# Patient Record
Sex: MALE | Race: WHITE | NOT HISPANIC OR LATINO | Employment: FULL TIME | URBAN - METROPOLITAN AREA
[De-identification: names, ages, dates, MRNs, and addresses within clinical notes are randomized per-mention and may not be internally consistent; named-entity substitution may affect disease eponyms.]

---

## 2022-01-20 PROBLEM — E55.9 VITAMIN D DEFICIENCY: Status: ACTIVE | Noted: 2022-01-20

## 2022-01-20 PROBLEM — L40.59 POLYARTICULAR PSORIATIC ARTHRITIS (HCC): Status: ACTIVE | Noted: 2022-01-20

## 2022-07-21 PROBLEM — L40.0 PLAQUE PSORIASIS: Status: ACTIVE | Noted: 2022-07-21

## 2022-07-21 PROBLEM — M46.90 DACTYLITIS DUE TO SPONDYLOARTHRITIC DISORDER (HCC): Status: ACTIVE | Noted: 2022-07-21

## 2024-01-19 LAB
ALBUMIN SERPL-MCNC: 4.3 G/DL (ref 3.6–5.1)
ALBUMIN/GLOB SERPL: 1.4 (CALC) (ref 1–2.5)
ALP SERPL-CCNC: 49 U/L (ref 36–130)
ALT SERPL-CCNC: 12 U/L (ref 9–46)
AST SERPL-CCNC: 20 U/L (ref 10–40)
BASOPHILS # BLD AUTO: 63 CELLS/UL (ref 0–200)
BASOPHILS NFR BLD AUTO: 0.9 %
BILIRUB SERPL-MCNC: 0.4 MG/DL (ref 0.2–1.2)
BUN SERPL-MCNC: 9 MG/DL (ref 7–25)
BUN/CREAT SERPL: NORMAL (CALC) (ref 6–22)
CALCIUM SERPL-MCNC: 9.5 MG/DL (ref 8.6–10.3)
CHLORIDE SERPL-SCNC: 103 MMOL/L (ref 98–110)
CO2 SERPL-SCNC: 30 MMOL/L (ref 20–32)
CREAT SERPL-MCNC: 0.74 MG/DL (ref 0.6–1.26)
CRP SERPL-MCNC: 0.7 MG/L
EOSINOPHIL # BLD AUTO: 259 CELLS/UL (ref 15–500)
EOSINOPHIL NFR BLD AUTO: 3.7 %
ERYTHROCYTE [DISTWIDTH] IN BLOOD BY AUTOMATED COUNT: 12.5 % (ref 11–15)
ERYTHROCYTE [SEDIMENTATION RATE] IN BLOOD BY WESTERGREN METHOD: 9 MM/H
GFR/BSA.PRED SERPLBLD CYS-BASED-ARV: 124 ML/MIN/1.73M2
GLOBULIN SER CALC-MCNC: 3.1 G/DL (CALC) (ref 1.9–3.7)
GLUCOSE SERPL-MCNC: 78 MG/DL (ref 65–139)
HCT VFR BLD AUTO: 42.5 % (ref 38.5–50)
HGB BLD-MCNC: 14.1 G/DL (ref 13.2–17.1)
LYMPHOCYTES # BLD AUTO: 1610 CELLS/UL (ref 850–3900)
LYMPHOCYTES NFR BLD AUTO: 23 %
MCH RBC QN AUTO: 30.6 PG (ref 27–33)
MCHC RBC AUTO-ENTMCNC: 33.2 G/DL (ref 32–36)
MCV RBC AUTO: 92.2 FL (ref 80–100)
MONOCYTES # BLD AUTO: 644 CELLS/UL (ref 200–950)
MONOCYTES NFR BLD AUTO: 9.2 %
NEUTROPHILS # BLD AUTO: 4424 CELLS/UL (ref 1500–7800)
NEUTROPHILS NFR BLD AUTO: 63.2 %
PLATELET # BLD AUTO: 306 THOUSAND/UL (ref 140–400)
PMV BLD REES-ECKER: 9.8 FL (ref 7.5–12.5)
POTASSIUM SERPL-SCNC: 4.6 MMOL/L (ref 3.5–5.3)
PROT SERPL-MCNC: 7.4 G/DL (ref 6.1–8.1)
RBC # BLD AUTO: 4.61 MILLION/UL (ref 4.2–5.8)
SODIUM SERPL-SCNC: 139 MMOL/L (ref 135–146)
WBC # BLD AUTO: 7 THOUSAND/UL (ref 3.8–10.8)

## 2024-02-01 ENCOUNTER — OFFICE VISIT (OUTPATIENT)
Age: 34
End: 2024-02-01

## 2024-02-01 VITALS
TEMPERATURE: 97.3 F | HEIGHT: 71 IN | SYSTOLIC BLOOD PRESSURE: 122 MMHG | DIASTOLIC BLOOD PRESSURE: 72 MMHG | OXYGEN SATURATION: 99 % | BODY MASS INDEX: 21.61 KG/M2 | WEIGHT: 154.4 LBS | HEART RATE: 44 BPM

## 2024-02-01 DIAGNOSIS — Z79.899 ENCOUNTER FOR LONG-TERM (CURRENT) USE OF OTHER MEDICATIONS: ICD-10-CM

## 2024-02-01 DIAGNOSIS — L40.0 PLAQUE PSORIASIS: ICD-10-CM

## 2024-02-01 DIAGNOSIS — L40.59 POLYARTICULAR PSORIATIC ARTHRITIS (HCC): Primary | ICD-10-CM

## 2024-02-01 DIAGNOSIS — E55.9 VITAMIN D DEFICIENCY: ICD-10-CM

## 2024-02-01 NOTE — PROGRESS NOTES
Assessment/Plan:    Polyarticular psoriatic arthritis (HCC)  Psoriatic arthritis which was previously treated with Humira.  He has been on Tremfya now since April 2023 in view of increased skin disease and incomplete response with Humira.  Overall his symptoms are generally stable however he does have persistent dry patches and flaking on his scalp that have not improved with Tremfya.  He also notices a slight increase in joint pain in the week or 2 prior to each injection.  He is using topical steroid cream however this only provides temporary relief.  I have asked him to follow-up with his dermatologist to discuss additional treatment options.  Given his persistent scalp issues along with some breakthrough joint pain it may be worth considering changing biologic therapy to Taltz for a different mechanism of action.  He would like to see his dermatologist and discuss this with them as well.  We will continue labs as ordered to monitor for medication side effects and toxicity.  He is up-to-date with his QuantiFERON gold testing.  Follow-up with dermatology now to discuss current symptoms.  We will plan to see him in 6 months however I have asked him to reach out after he speaks to his dermatologist regarding changing Biologics.    Plaque psoriasis  Persistent dryness and flaking in his scalp.  He does have some dry patches on his leg noted as well.  Follow-up with dermatology to discuss current therapies.    Vitamin D deficiency  We will continue to monitor vitamin D.  Continue over-the-counter vitamin D3 supplement.       Diagnoses and all orders for this visit:    Polyarticular psoriatic arthritis (HCC)  -     CBC and differential; Future  -     Comprehensive metabolic panel; Future  -     Sedimentation rate, automated; Future  -     C-reactive protein; Future  -     UA (URINE) with reflex to Scope  -     Quantiferon TB Gold Plus Assay; Future    Vitamin D deficiency  -     Vitamin D 25 hydroxy; Future    Plaque  psoriasis    Encounter for long-term (current) use of other medications  -     CBC and differential; Future  -     Comprehensive metabolic panel; Future  -     Sedimentation rate, automated; Future  -     C-reactive protein; Future  -     UA (URINE) with reflex to Scope  -     Quantiferon TB Gold Plus Assay; Future    Other orders  -     Multiple Vitamins-Minerals (MULTIVITAMIN ADULTS PO); Take by mouth        Spent 30 minutes total reviewing labs, chart notes, imaging studies, performing history and physical exam, discussing medication and treatment, counseling patient, and completing chart note.          Subjective:      Patient ID: Benoit Valencia is a 33 y.o. male.    He is here for follow-up of his psoriatic arthritis.  He was initially treated with Humira however he was switched to Tremfya in April 2023 in view of increased skin disease.  He has a history of dactylitis in his right great toe.  His symptoms are generally stable however he continues to have persistent dry patches on flaking on his scalp.  He also notes a slight increase in joint pain in the week or 2 prior to each Tremfya injection.  He did recently have an episode of discomfort in his right foot as well which resolved shortly after injecting.  He also notices occasional episodes of lower back pain and discomfort.  He has no joint swelling and no signs of dactylitis or enthesitis at this time.    He had labs done on 1/18/2024.  CBC, CMP unremarkable.  ESR, CRP within normal limits.  He is up-to-date with his QuantiFERON gold testing and had a negative test on 7/13/2023.      The following portions of the patient's history were reviewed and updated as appropriate: allergies, current medications, past family history, past medical history, past social history, past surgical history, and problem list.    Review of Systems   Constitutional:  Negative for chills, fatigue and fever.   HENT:  Negative for hearing loss, sore throat and tinnitus.    Eyes:  " Negative for pain and visual disturbance.   Respiratory:  Negative for cough and shortness of breath.    Cardiovascular:  Negative for chest pain and palpitations.   Gastrointestinal:  Negative for abdominal pain, nausea and vomiting.   Genitourinary:  Negative for difficulty urinating.   Musculoskeletal:  Negative for arthralgias, back pain, gait problem, joint swelling, myalgias, neck pain and neck stiffness.   Skin:  Negative for rash.   Neurological:  Negative for dizziness, seizures, weakness, numbness and headaches.   Psychiatric/Behavioral:  Negative for confusion, decreased concentration and sleep disturbance.          Objective:      /72 (BP Location: Left arm, Patient Position: Sitting, Cuff Size: Adult)   Pulse (!) 44   Temp (!) 97.3 °F (36.3 °C) (Tympanic)   Ht 5' 10.75\" (1.797 m)   Wt 70 kg (154 lb 6.4 oz)   SpO2 99%   BMI 21.69 kg/m²          Physical Exam  Constitutional:       Appearance: Normal appearance.   Cardiovascular:      Rate and Rhythm: Normal rate and regular rhythm.   Pulmonary:      Breath sounds: Normal breath sounds.   Musculoskeletal:         General: No swelling. Normal range of motion.      Cervical back: Neck supple.      Comments:  Full range of motion neck, bilateral shoulders, elbows, wrists.  No synovitis noted.  Full range of motion bilateral hips, knees, ankles.  Patellofemoral crepitus noted bilateral knees.  No dactylitis noted.  Tailor's bunions bilateral feet.   Skin:     General: Skin is warm and dry.   Neurological:      General: No focal deficit present.      Mental Status: He is alert.         Dragon Dictation software was used to dictate this note. It may contain errors with dictating incorrect words/spelling. Please contact provider directly for any questions.    "

## 2024-02-01 NOTE — ASSESSMENT & PLAN NOTE
Persistent dryness and flaking in his scalp.  He does have some dry patches on his leg noted as well.  Follow-up with dermatology to discuss current therapies.

## 2024-02-01 NOTE — ASSESSMENT & PLAN NOTE
Psoriatic arthritis which was previously treated with Humira.  He has been on Tremfya now since April 2023 in view of increased skin disease and incomplete response with Humira.  Overall his symptoms are generally stable however he does have persistent dry patches and flaking on his scalp that have not improved with Tremfya.  He also notices a slight increase in joint pain in the week or 2 prior to each injection.  He is using topical steroid cream however this only provides temporary relief.  I have asked him to follow-up with his dermatologist to discuss additional treatment options.  Given his persistent scalp issues along with some breakthrough joint pain it may be worth considering changing biologic therapy to Taltz for a different mechanism of action.  He would like to see his dermatologist and discuss this with them as well.  We will continue labs as ordered to monitor for medication side effects and toxicity.  He is up-to-date with his QuantiFERON gold testing.  Follow-up with dermatology now to discuss current symptoms.  We will plan to see him in 6 months however I have asked him to reach out after he speaks to his dermatologist regarding changing Biologics.

## 2024-02-23 ENCOUNTER — TELEPHONE (OUTPATIENT)
Dept: RHEUMATOLOGY | Facility: CLINIC | Age: 34
End: 2024-02-23

## 2024-02-23 NOTE — TELEPHONE ENCOUNTER
Patient called Rx refill line requesting Taltz medication that was discussed at his last visit. He has decided to go forward with this and called his dermatologist. They wanted him to have Rheumatology handle this medication. Please contact patient at [phone]

## 2024-02-26 ENCOUNTER — TELEPHONE (OUTPATIENT)
Age: 34
End: 2024-02-26

## 2024-02-27 NOTE — TELEPHONE ENCOUNTER
Spoke with the patient in detail regarding his psoriatic arthritis.  He has had some continued flaking and rash on his lower extremity.  He did see the dermatologist who was a bit concerned about him switching to another biologic because there were only a limited number of agents to use for psoriatic arthritis.  He was given a topical which is helping.  He has had intermittent hip soreness.  His next dose of Tremfya is due in the next week, which would make it difficult to get prior authorization for Taltz at the time he would be due for Tremfya.  We both agreed that it would be best to add him as a more urgent follow-up on April 18 since I opened up that day, and if he is still having skin and joint symptoms, I will order Taltz and we can do the prior authorization.

## 2024-04-10 ENCOUNTER — TELEPHONE (OUTPATIENT)
Age: 34
End: 2024-04-10

## 2024-04-18 ENCOUNTER — OFFICE VISIT (OUTPATIENT)
Age: 34
End: 2024-04-18
Payer: COMMERCIAL

## 2024-04-18 VITALS
DIASTOLIC BLOOD PRESSURE: 78 MMHG | WEIGHT: 156.2 LBS | HEIGHT: 70 IN | OXYGEN SATURATION: 99 % | TEMPERATURE: 98.1 F | SYSTOLIC BLOOD PRESSURE: 122 MMHG | BODY MASS INDEX: 22.36 KG/M2 | HEART RATE: 57 BPM

## 2024-04-18 DIAGNOSIS — Z79.899 ENCOUNTER FOR LONG-TERM (CURRENT) USE OF OTHER MEDICATIONS: ICD-10-CM

## 2024-04-18 DIAGNOSIS — L40.59 POLYARTICULAR PSORIATIC ARTHRITIS (HCC): Primary | ICD-10-CM

## 2024-04-18 DIAGNOSIS — E55.9 VITAMIN D DEFICIENCY: ICD-10-CM

## 2024-04-18 DIAGNOSIS — L40.0 PLAQUE PSORIASIS: ICD-10-CM

## 2024-04-18 DIAGNOSIS — M46.90 DACTYLITIS DUE TO SPONDYLOARTHRITIC DISORDER (HCC): ICD-10-CM

## 2024-04-18 PROCEDURE — 99214 OFFICE O/P EST MOD 30 MIN: CPT | Performed by: INTERNAL MEDICINE

## 2024-04-18 RX ORDER — IXEKIZUMAB 80 MG/ML
INJECTION, SOLUTION SUBCUTANEOUS
Qty: 4 ML | Refills: 2 | Status: SHIPPED | OUTPATIENT
Start: 2024-04-18

## 2024-04-18 NOTE — PROGRESS NOTES
Assessment/Plan:    Polyarticular psoriatic arthritis (HCC)  Psoriatic arthritis with history of incomplete response to Humira, who has been on Tremfya for 1 year with persistent flare of skin disease, and joint pain.  He notes metatarsalgia right foot and does have left back pain.  It does not keep him awake at night.  He had a recent flare of Psoriasis treated with additional topicals per dermatology.  His back pain tends to be worse with movement.  He is still able to run 2 to 4 miles and do weight training.  Scalp psoriasis has improved with recent sun exposure and topicals.  Have discussed the risk-benefit profile of Taltz in detail with the patient as well as the indication and administration.  He would like to proceed with switching agents.  He can start Taltz the day he would be due for Tremfya.  I have asked him to get new baseline lab work in the next week or so.  Monitor disease activity and medication toxicity with lab work as ordered.  Follow-up in September or sooner if needed.    Plaque psoriasis  Recent flare Psoriasis treated with topicals per dermatology.  He has also been in the sun and notes improvement in his skin with sun exposure.  Probable incomplete response to Tremfya.  Once approved by insurance, will switch patient to Taltz which he will start at the time he is due for his next Tremfya in 4 to 5 weeks.  Will give dosing for psoriatic arthritis with moderate Psoriasis.  Monitor disease activity and medication toxicity with lab work as ordered.    Dactylitis due to spondyloarthritic disorder (HCC)  No current signs or symptoms of dactylitis.  No evidence of enthesopathy.  He does have incomplete response with Tremfya with flare of skin and joint disease.  Plan to switch to Taltz injections at the time his next Tremfya is due.  Monitor disease activity and medication toxicity with lab work as ordered.  Continue to monitor.    Vitamin D deficiency  Continue vitamin D supplement as directed.   Monitor vitamin D yearly.         Problem List Items Addressed This Visit       Polyarticular psoriatic arthritis (HCC) - Primary     Psoriatic arthritis with history of incomplete response to Humira, who has been on Tremfya for 1 year with persistent flare of skin disease, and joint pain.  He notes metatarsalgia right foot and does have left back pain.  It does not keep him awake at night.  He had a recent flare of Psoriasis treated with additional topicals per dermatology.  His back pain tends to be worse with movement.  He is still able to run 2 to 4 miles and do weight training.  Scalp psoriasis has improved with recent sun exposure and topicals.  Have discussed the risk-benefit profile of Taltz in detail with the patient as well as the indication and administration.  He would like to proceed with switching agents.  He can start Taltz the day he would be due for Tremfya.  I have asked him to get new baseline lab work in the next week or so.  Monitor disease activity and medication toxicity with lab work as ordered.  Follow-up in September or sooner if needed.         Relevant Medications    ixekizumab (Taltz) 80 MG/ML subcutaneous injection    Other Relevant Orders    Quantiferon TB Gold Plus Assay    CBC and differential    Comprehensive metabolic panel    Sedimentation rate, automated    C-reactive protein    Urinalysis with microscopic    Vitamin D deficiency     Continue vitamin D supplement as directed.  Monitor vitamin D yearly.         Plaque psoriasis     Recent flare Psoriasis treated with topicals per dermatology.  He has also been in the sun and notes improvement in his skin with sun exposure.  Probable incomplete response to Tremfya.  Once approved by insurance, will switch patient to Taltz which he will start at the time he is due for his next Tremfya in 4 to 5 weeks.  Will give dosing for psoriatic arthritis with moderate Psoriasis.  Monitor disease activity and medication toxicity with lab work as  ordered.         Relevant Medications    ixekizumab (Taltz) 80 MG/ML subcutaneous injection    Dactylitis due to spondyloarthritic disorder (HCC)     No current signs or symptoms of dactylitis.  No evidence of enthesopathy.  He does have incomplete response with Tremfya with flare of skin and joint disease.  Plan to switch to Taltz injections at the time his next Tremfya is due.  Monitor disease activity and medication toxicity with lab work as ordered.  Continue to monitor.          Other Visit Diagnoses       Encounter for long-term (current) use of other medications                    Reviewed records, labs, and imaging with the patient in detail.  Counseled patient.  Discussion regarding my findings and recommendations.  Office visit with documentation 35 min.    Subjective:      Patient ID: Benoit Valencia is a 34 y.o. male.    Patient with psoriatic arthritis.  He was initially treated with Humira however he was switched to Tremfya in April 2023 in view of increased skin disease.  He has a history of dactylitis in his right great toe.  His symptoms are generally stable however he continues to have persistent dry patches on flaking on his scalp.  He also notes an increase in joint pain in the week or 2 prior to each Tremfya injection.  He did recently have an episode of discomfort in his right foot as well which resolved shortly after injecting.  He also notices occasional episodes of lower back pain and discomfort.  He has had some left hip arthralgias generally worse with movement.  He has been running 2 to 4 miles several times weekly and lifting weights.  Morning stiffness is 30 minutes duration.  He has no joint swelling and no signs of dactylitis or enthesitis at this time.  His next dose of Tremfya will be due in 4 to 5 weeks.    He had labs done on 1/18/2024.  CBC, CMP unremarkable.  ESR, CRP within normal limits.  He is up-to-date with his QuantiFERON gold testing and had a negative test on  7/13/2023.        Allergies  No Known Allergies    Home Medications    Current Outpatient Medications:     ixekizumab (Taltz) 80 MG/ML subcutaneous injection, Day 0 inject 80 mg subcu into each thigh, then inject 80 mg subcu every 2 weeks for 6 doses.  Then inject 80 mg subcu every 4 weeks thereafter., Disp: 4 mL, Rfl: 2    Multiple Vitamins-Minerals (MULTIVITAMIN ADULTS PO), Take by mouth, Disp: , Rfl:     ergocalciferol (VITAMIN D2) 50,000 units, Take 1 capsule (50,000 Units total) by mouth once a week for 12 doses, Disp: 12 capsule, Rfl: 0    Past Medical History  Past Medical History:   Diagnosis Date    Psoriasis     Psoriatic arthritis (HCC)     Vitamin D insufficiency     Treated       Past Surgical History   Past Surgical History:   Procedure Laterality Date    WISDOM TOOTH EXTRACTION N/A        Family History   Family History   Problem Relation Age of Onset    Hypothyroidism Father     Arthritis Family         grandparents       The following portions of the patient's history were reviewed and updated as appropriate: allergies, current medications, past family history, past medical history, past social history, past surgical history, and problem list.    Review of Systems   Constitutional:  Negative for chills, fatigue and fever.   HENT:  Negative for ear pain, hearing loss, sore throat and tinnitus.    Eyes:  Negative for pain and visual disturbance.   Respiratory:  Negative for cough and shortness of breath.    Cardiovascular:  Negative for chest pain and palpitations.   Gastrointestinal:  Negative for abdominal pain, nausea and vomiting.   Genitourinary:  Negative for difficulty urinating, dysuria and hematuria.   Musculoskeletal:  Positive for arthralgias and back pain. Negative for gait problem, joint swelling, myalgias, neck pain and neck stiffness.   Skin:  Negative for color change and rash.        Dry patches/psoriasis scalp and around hairline. No onycholysis or nail pitting.  No plaque psoriasis  "appreciated on extremities or trunk.   Neurological:  Negative for dizziness, seizures, syncope, weakness, numbness and headaches.   Psychiatric/Behavioral:  Negative for confusion, decreased concentration and sleep disturbance.    All other systems reviewed and are negative.        Objective:      /78 (BP Location: Right arm, Patient Position: Sitting, Cuff Size: Adult)   Pulse 57   Temp 98.1 °F (36.7 °C) (Temporal)   Ht 5' 10\" (1.778 m)   Wt 70.9 kg (156 lb 3.2 oz)   SpO2 99%   BMI 22.41 kg/m²          Physical Exam  Vitals reviewed.   Constitutional:       Appearance: Normal appearance.   HENT:      Head: Normocephalic.      Nose:      Comments: Nose and throat unremarkable  Cardiovascular:      Rate and Rhythm: Normal rate and regular rhythm.   Pulmonary:      Breath sounds: Normal breath sounds.   Abdominal:      Palpations: Abdomen is soft.   Musculoskeletal:         General: Normal range of motion.      Cervical back: Neck supple.      Comments: Full range of motion neck, bilateral shoulders, elbows, wrists.  No synovitis noted.  Schober's negative.  No SI joint tenderness appreciated.  Marked spasm left thoracic and lumbar paraspinals with left shoulder higher than right due to spasm.  Schober's negative.  No SI joint tenderness noted.  Full range of motion bilateral hips, knees, ankles.  Patellofemoral crepitus noted bilateral knees.  No dactylitis noted.  Tailor's bunions bilateral feet.    Skin:     General: Skin is warm and dry.      Comments: Slight psoriasis scalp especially around hairline and frontal scalp.  Few patches upper back.  No significant nail disease, but there are questionable minimal pits noted on a few finger nails on the right hand.  No onycholysis.   Neurological:      General: No focal deficit present.      Mental Status: He is alert.               This note was written in part using the assistance of the [employer] Direct [id number] microphone system. " Those portions using this system have been dictated and not read.

## 2024-04-18 NOTE — PATIENT INSTRUCTIONS
I suggest that you have formal medical muscle massage therapy through a chiropractor's office.  You are incredibly tight in your left side throughout your lumbar paraspinals.  Your left shoulder is a little higher than your right because of the spasm.  We will submit for Taltz and notify you as soon as you are approved.  You can start the Taltz the day that you would be due for your next Tremfya.  As I said day 0 you would inject 1 autoinjector in each thigh followed by 1 injection every 2 weeks thereafter for 6 injections.  After the 6 injection you will start 1 injection every 4 weeks thereafter.  Get the lab work completed.  You will be due for the TB blood test called QuantiFERON gold summer around July.  Add that with the next lab work.

## 2024-04-18 NOTE — ASSESSMENT & PLAN NOTE
Psoriatic arthritis with history of incomplete response to Humira, who has been on Tremfya for 1 year with persistent flare of skin disease, and joint pain.  He notes metatarsalgia right foot and does have left back pain.  It does not keep him awake at night.  He had a recent flare of Psoriasis treated with additional topicals per dermatology.  His back pain tends to be worse with movement.  He is still able to run 2 to 4 miles and do weight training.  Scalp psoriasis has improved with recent sun exposure and topicals.  Have discussed the risk-benefit profile of Taltz in detail with the patient as well as the indication and administration.  He would like to proceed with switching agents.  He can start Taltz the day he would be due for Tremfya.  I have asked him to get new baseline lab work in the next week or so.  Monitor disease activity and medication toxicity with lab work as ordered.  Follow-up in September or sooner if needed.

## 2024-04-20 ENCOUNTER — TELEPHONE (OUTPATIENT)
Age: 34
End: 2024-04-20

## 2024-04-20 NOTE — TELEPHONE ENCOUNTER
PA for Taltz    Submitted via    []CMM-KEY    [x][facility] [employer]-Case ID #     [id number]     []Faxed to plan   []Other website    []Phone call Case ID #      Office notes sent, clinical questions answered. Awaiting determination    Turnaround time for your insurance to make a decision on your Prior Authorization can take 7-21 business days.

## 2024-05-12 LAB
25(OH)D3 SERPL-MCNC: 32 NG/ML (ref 30–100)
ALBUMIN SERPL-MCNC: 4.2 G/DL (ref 3.6–5.1)
ALBUMIN/GLOB SERPL: 1.3 (CALC) (ref 1–2.5)
ALP SERPL-CCNC: 64 U/L (ref 36–130)
ALT SERPL-CCNC: 16 U/L (ref 9–46)
APPEARANCE UR: CLEAR
AST SERPL-CCNC: 20 U/L (ref 10–40)
BASOPHILS # BLD AUTO: 59 CELLS/UL (ref 0–200)
BASOPHILS NFR BLD AUTO: 0.9 %
BILIRUB SERPL-MCNC: 0.4 MG/DL (ref 0.2–1.2)
BILIRUB UR QL STRIP: NEGATIVE
BUN SERPL-MCNC: 17 MG/DL (ref 7–25)
BUN/CREAT SERPL: NORMAL (CALC) (ref 6–22)
CALCIUM SERPL-MCNC: 9.6 MG/DL (ref 8.6–10.3)
CHLORIDE SERPL-SCNC: 101 MMOL/L (ref 98–110)
CO2 SERPL-SCNC: 31 MMOL/L (ref 20–32)
COLOR UR: YELLOW
CREAT SERPL-MCNC: 0.86 MG/DL (ref 0.6–1.26)
CRP SERPL-MCNC: <3 MG/L
EOSINOPHIL # BLD AUTO: 481 CELLS/UL (ref 15–500)
EOSINOPHIL NFR BLD AUTO: 7.4 %
ERYTHROCYTE [DISTWIDTH] IN BLOOD BY AUTOMATED COUNT: 12.8 % (ref 11–15)
ERYTHROCYTE [SEDIMENTATION RATE] IN BLOOD BY WESTERGREN METHOD: 9 MM/H
GFR/BSA.PRED SERPLBLD CYS-BASED-ARV: 118 ML/MIN/1.73M2
GLOBULIN SER CALC-MCNC: 3.2 G/DL (CALC) (ref 1.9–3.7)
GLUCOSE SERPL-MCNC: 79 MG/DL (ref 65–139)
GLUCOSE UR QL STRIP: NEGATIVE
HCT VFR BLD AUTO: 39.8 % (ref 38.5–50)
HGB BLD-MCNC: 13.6 G/DL (ref 13.2–17.1)
HGB UR QL STRIP: NEGATIVE
KETONES UR QL STRIP: NEGATIVE
LEUKOCYTE ESTERASE UR QL STRIP: NEGATIVE
LYMPHOCYTES # BLD AUTO: 1417 CELLS/UL (ref 850–3900)
LYMPHOCYTES NFR BLD AUTO: 21.8 %
M TB IFN-G CD4+ BCKGRND COR BLD-ACNC: 0 IU/ML
M TB IFN-G CD4+ BCKGRND COR BLD-ACNC: 0 IU/ML
M TB IFN-G CD4+ T-CELLS BLD-ACNC: 0.01 IU/ML
M TB TUBERC IFN-G BLD QL: NEGATIVE
M TB TUBERC IGNF/MITOGEN IGNF CONTROL: >10 IU/ML
MCH RBC QN AUTO: 31.3 PG (ref 27–33)
MCHC RBC AUTO-ENTMCNC: 34.2 G/DL (ref 32–36)
MCV RBC AUTO: 91.5 FL (ref 80–100)
MONOCYTES # BLD AUTO: 605 CELLS/UL (ref 200–950)
MONOCYTES NFR BLD AUTO: 9.3 %
NEUTROPHILS # BLD AUTO: 3939 CELLS/UL (ref 1500–7800)
NEUTROPHILS NFR BLD AUTO: 60.6 %
NITRITE UR QL STRIP: NEGATIVE
PH UR STRIP: 7 [PH] (ref 5–8)
PLATELET # BLD AUTO: 282 THOUSAND/UL (ref 140–400)
PMV BLD REES-ECKER: 10 FL (ref 7.5–12.5)
POTASSIUM SERPL-SCNC: 4.6 MMOL/L (ref 3.5–5.3)
PROT SERPL-MCNC: 7.4 G/DL (ref 6.1–8.1)
PROT UR QL STRIP: NEGATIVE
RBC # BLD AUTO: 4.35 MILLION/UL (ref 4.2–5.8)
SODIUM SERPL-SCNC: 138 MMOL/L (ref 135–146)
SP GR UR STRIP: 1.01 (ref 1–1.03)
WBC # BLD AUTO: 6.5 THOUSAND/UL (ref 3.8–10.8)

## 2024-05-13 NOTE — ASSESSMENT & PLAN NOTE
No current signs or symptoms of dactylitis.  No evidence of enthesopathy.  He does have incomplete response with Tremfya with flare of skin and joint disease.  Plan to switch to Taltz injections at the time his next Tremfya is due.  Monitor disease activity and medication toxicity with lab work as ordered.  Continue to monitor.

## 2024-05-13 NOTE — ASSESSMENT & PLAN NOTE
Recent flare Psoriasis treated with topicals per dermatology.  He has also been in the sun and notes improvement in his skin with sun exposure.  Probable incomplete response to Tremfya.  Once approved by insurance, will switch patient to Taltz which he will start at the time he is due for his next Tremfya in 4 to 5 weeks.  Will give dosing for psoriatic arthritis with moderate Psoriasis.  Monitor disease activity and medication toxicity with lab work as ordered.

## 2024-05-14 ENCOUNTER — TELEPHONE (OUTPATIENT)
Age: 34
End: 2024-05-14

## 2024-05-14 NOTE — TELEPHONE ENCOUNTER
Left patient a detailed message letting him know we did not receive a determination from his insurance yet. We also do not carry samples anymore.

## 2024-05-14 NOTE — TELEPHONE ENCOUNTER
Patient called the RX Refill Line. Message is being forwarded to the office.     Patient is waiting for the appeal decision for the medication taltz. Patient is asking if the office has any samples they can give to him.     Please contact patient at [phone]

## 2024-05-14 NOTE — TELEPHONE ENCOUNTER
Patient calling again to check on the appeal for the medication ixekizumab (Taltz) 80 MG/ML  . Advised patient looks like the appeal was started on 5/2/24 and can take up to 30 days. Patient asking if prior auth team can check on this for him.

## 2024-05-15 NOTE — TELEPHONE ENCOUNTER
Pt called refill line and is very frustrated that there has been no response for his appeal and feels that no one is in any rush to help him. He is very concerned as to how his body is feeling and does not feel that anyone else is as concerned. He states that he reached out to his insurance company and they stated that they are just waiting on more information and that once they get it it will only take them up to 5 days to approve the Rx.  Pt is requesting the someone reach out to the insurance company prior auth department and figure out what can be submitted to expedite the process of getting his Rx approved. Please check on status and see if there is anything else to submit.

## 2024-05-22 NOTE — TELEPHONE ENCOUNTER
Spoke with pharmacy and gave clarification on his prescription. They are filling:  - 160 mg (two 80 mg injections) at Week 0, followed by 80 mg at Week 2   Quantity: one 3 pack, no refill  - 80mg at weeks 4, 6, 8, and 10  Quantity: one 2 pack, 1 refill  - 80mg at week 12, then every 4 weeks thereafter  Quantity: 1 auto injector, 3 refills    Called patient and left him a detailed message letting him that his Taltz was approved and the pharmacy will be shipping his med. As soon as he gets the medication we can set up a teaching appointment.

## 2024-09-30 DIAGNOSIS — L40.59 POLYARTICULAR PSORIATIC ARTHRITIS (HCC): ICD-10-CM

## 2024-09-30 RX ORDER — IXEKIZUMAB 80 MG/ML
INJECTION, SOLUTION SUBCUTANEOUS
Qty: 2 ML | Refills: 12 | Status: SHIPPED | OUTPATIENT
Start: 2024-09-30

## 2024-10-04 LAB
ALBUMIN SERPL-MCNC: 4.2 G/DL (ref 3.6–5.1)
ALBUMIN/GLOB SERPL: 1.3 (CALC) (ref 1–2.5)
ALP SERPL-CCNC: 67 U/L (ref 36–130)
ALT SERPL-CCNC: 19 U/L (ref 9–46)
APPEARANCE UR: CLEAR
AST SERPL-CCNC: 30 U/L (ref 10–40)
BASOPHILS # BLD AUTO: 40 CELLS/UL (ref 0–200)
BASOPHILS NFR BLD AUTO: 0.8 %
BILIRUB SERPL-MCNC: 0.4 MG/DL (ref 0.2–1.2)
BILIRUB UR QL STRIP: NEGATIVE
BUN SERPL-MCNC: 17 MG/DL (ref 7–25)
BUN/CREAT SERPL: NORMAL (CALC) (ref 6–22)
CALCIUM SERPL-MCNC: 9.8 MG/DL (ref 8.6–10.3)
CHLORIDE SERPL-SCNC: 104 MMOL/L (ref 98–110)
CO2 SERPL-SCNC: 25 MMOL/L (ref 20–32)
COLOR UR: YELLOW
CREAT SERPL-MCNC: 0.86 MG/DL (ref 0.6–1.26)
CRP SERPL-MCNC: <3 MG/L
EOSINOPHIL # BLD AUTO: 390 CELLS/UL (ref 15–500)
EOSINOPHIL NFR BLD AUTO: 7.8 %
ERYTHROCYTE [DISTWIDTH] IN BLOOD BY AUTOMATED COUNT: 12.2 % (ref 11–15)
ERYTHROCYTE [SEDIMENTATION RATE] IN BLOOD BY WESTERGREN METHOD: 9 MM/H
GFR/BSA.PRED SERPLBLD CYS-BASED-ARV: 118 ML/MIN/1.73M2
GLOBULIN SER CALC-MCNC: 3.3 G/DL (CALC) (ref 1.9–3.7)
GLUCOSE SERPL-MCNC: 108 MG/DL (ref 65–139)
GLUCOSE UR QL STRIP: NEGATIVE
HCT VFR BLD AUTO: 40.2 % (ref 38.5–50)
HGB BLD-MCNC: 13.6 G/DL (ref 13.2–17.1)
HGB UR QL STRIP: NEGATIVE
KETONES UR QL STRIP: NEGATIVE
LEUKOCYTE ESTERASE UR QL STRIP: NEGATIVE
LYMPHOCYTES # BLD AUTO: 1280 CELLS/UL (ref 850–3900)
LYMPHOCYTES NFR BLD AUTO: 25.6 %
MCH RBC QN AUTO: 31.5 PG (ref 27–33)
MCHC RBC AUTO-ENTMCNC: 33.8 G/DL (ref 32–36)
MCV RBC AUTO: 93.1 FL (ref 80–100)
MONOCYTES # BLD AUTO: 485 CELLS/UL (ref 200–950)
MONOCYTES NFR BLD AUTO: 9.7 %
NEUTROPHILS # BLD AUTO: 2805 CELLS/UL (ref 1500–7800)
NEUTROPHILS NFR BLD AUTO: 56.1 %
NITRITE UR QL STRIP: NEGATIVE
PH UR STRIP: 6.5 [PH] (ref 5–8)
PLATELET # BLD AUTO: 250 THOUSAND/UL (ref 140–400)
PMV BLD REES-ECKER: 10 FL (ref 7.5–12.5)
POTASSIUM SERPL-SCNC: 4.5 MMOL/L (ref 3.5–5.3)
PROT SERPL-MCNC: 7.5 G/DL (ref 6.1–8.1)
PROT UR QL STRIP: NEGATIVE
RBC # BLD AUTO: 4.32 MILLION/UL (ref 4.2–5.8)
SODIUM SERPL-SCNC: 139 MMOL/L (ref 135–146)
SP GR UR STRIP: 1.02 (ref 1–1.03)
WBC # BLD AUTO: 5 THOUSAND/UL (ref 3.8–10.8)

## 2024-10-10 ENCOUNTER — OFFICE VISIT (OUTPATIENT)
Age: 34
End: 2024-10-10

## 2024-10-10 VITALS
TEMPERATURE: 96.7 F | DIASTOLIC BLOOD PRESSURE: 62 MMHG | HEIGHT: 70 IN | OXYGEN SATURATION: 99 % | SYSTOLIC BLOOD PRESSURE: 120 MMHG | BODY MASS INDEX: 21.76 KG/M2 | WEIGHT: 152 LBS | HEART RATE: 52 BPM

## 2024-10-10 DIAGNOSIS — M46.90 DACTYLITIS DUE TO SPONDYLOARTHRITIC DISORDER (HCC): ICD-10-CM

## 2024-10-10 DIAGNOSIS — E55.9 VITAMIN D DEFICIENCY: ICD-10-CM

## 2024-10-10 DIAGNOSIS — L40.59 POLYARTICULAR PSORIATIC ARTHRITIS (HCC): Primary | ICD-10-CM

## 2024-10-10 DIAGNOSIS — L40.0 PLAQUE PSORIASIS: ICD-10-CM

## 2024-10-10 RX ORDER — NEOMYCIN SULFATE, POLYMYXIN B SULFATE AND HYDROCORTISONE 10; 3.5; 1 MG/ML; MG/ML; [USP'U]/ML
SUSPENSION/ DROPS AURICULAR (OTIC)
COMMUNITY
Start: 2024-09-10

## 2024-10-10 NOTE — PATIENT INSTRUCTIONS
I gave you a new slip for lab work for every 3 months.  I also gave you a prescription for the TB blood test called QuantiFERON gold to do in May of next year.  Give me a call in January to let me know how you made out with dosing every 2 weeks for 6 doses.  After that go to every 4 weeks as before.

## 2024-10-10 NOTE — PROGRESS NOTES
Assessment/Plan:    Polyarticular psoriatic arthritis (HCC)  Psoriatic arthritis with history of incomplete response to Humira and Tremfya with no current joint pain.  Back pain noted on prior visit resolved with chiropractic treatment.  He does not have history of inflammatory back symptoms.  He is able to run several times weekly and do weight training.  He did start Taltz over the summer, however, he did not do the induction dosing every 2 weeks as ordered on the prescription, and has only had approximately 4 injections he believes thus far.  He does have some scalp psoriasis and minimal changes behind his ears.  The questionable early nail pitting noted in his right hand at the last visit has resolved however.  I suspect that he has had an incomplete response with Taltz since he did not dose it as ordered every 2 weeks for induction.  I have suggested that he dose every 2 weeks for 6 doses total and then go back to every 4 weeks thereafter to see if we can induce a better response with every 2-week dosing for approximately 3 months.  I asked him to call in January to report on his response to the change in dosing.  Monitor disease activity and medication toxicity with lab work as ordered.  Monitor QuantiFERON gold yearly.  He will be due in May.  New prescription for given for lab work.  Follow-up 6 months or sooner if needed.    Plaque psoriasis  Psoriatic arthritis with Psoriasis with incomplete response with Humira and Tremfya.  He did start Taltz over the summer, however, he did not do the induction dosing every 2 weeks, and has only been injecting every 4 weeks with a total of approximately 4 doses thus far.  I suspect this is why he has not had significant benefit with his scalp Psoriasis.  The nail pitting seen on his right hand at the last office visit has resolved however and I do think that the Psoriasis behind his ears has lessened.  I suggested that he dose every 2 weeks for 6 doses to see if that  will impact his response to therapy.  I asked him to call in January to report on his response.  Monitor disease activity and medication toxicity with lab work as ordered.  Follow-up 6 months or sooner if needed.  Follow-up with dermatologist once yearly or more often if needed.    Dactylitis due to spondyloarthritic disorder (HCC)  No recurrence of dactylitis.  No sign of enthesopathy.  Recent switch to Taltz, however, he did not dose as ordered every 2 weeks for 6 doses after the initial 2 injections.  He has persistence of scalp Psoriasis, with less skin involvement behind his years than on the prior visit.  He has no evidence of the nail pitting that he had in his right hand at the last office visit.  I suspect that he has not had a more significant response since he did not dose as prescribed.  I did ask him to dose every 2 weeks for total of 6 doses and then go back to every 4-week dosing thereafter.  He will call in January to report on his response.  Monitor disease activity and medication toxicity with lab work as ordered.  Follow-up 6 months or sooner if needed.  Follow-up with dermatologist yearly or sooner if needed.    Vitamin D deficiency  Continue vitamin D supplement as directed.  Monitor vitamin D yearly.         Problem List Items Addressed This Visit       Polyarticular psoriatic arthritis (HCC) - Primary     Psoriatic arthritis with history of incomplete response to Humira and Tremfya with no current joint pain.  Back pain noted on prior visit resolved with chiropractic treatment.  He does not have history of inflammatory back symptoms.  He is able to run several times weekly and do weight training.  He did start Taltz over the summer, however, he did not do the induction dosing every 2 weeks as ordered on the prescription, and has only had approximately 4 injections he believes thus far.  He does have some scalp psoriasis and minimal changes behind his ears.  The questionable early nail pitting  noted in his right hand at the last visit has resolved however.  I suspect that he has had an incomplete response with Taltz since he did not dose it as ordered every 2 weeks for induction.  I have suggested that he dose every 2 weeks for 6 doses total and then go back to every 4 weeks thereafter to see if we can induce a better response with every 2-week dosing for approximately 3 months.  I asked him to call in January to report on his response to the change in dosing.  Monitor disease activity and medication toxicity with lab work as ordered.  Monitor QuantiFERON gold yearly.  He will be due in May.  New prescription for given for lab work.  Follow-up 6 months or sooner if needed.         Relevant Orders    CBC and differential    Comprehensive metabolic panel    Sedimentation rate, automated    C-reactive protein    Urinalysis with microscopic    Quantiferon TB Gold Plus Assay    Vitamin D deficiency     Continue vitamin D supplement as directed.  Monitor vitamin D yearly.         Plaque psoriasis     Psoriatic arthritis with Psoriasis with incomplete response with Humira and Tremfya.  He did start Taltz over the summer, however, he did not do the induction dosing every 2 weeks, and has only been injecting every 4 weeks with a total of approximately 4 doses thus far.  I suspect this is why he has not had significant benefit with his scalp Psoriasis.  The nail pitting seen on his right hand at the last office visit has resolved however and I do think that the Psoriasis behind his ears has lessened.  I suggested that he dose every 2 weeks for 6 doses to see if that will impact his response to therapy.  I asked him to call in January to report on his response.  Monitor disease activity and medication toxicity with lab work as ordered.  Follow-up 6 months or sooner if needed.  Follow-up with dermatologist once yearly or more often if needed.         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  No sign of enthesopathy.  Recent switch to Taltz, however, he did not dose as ordered every 2 weeks for 6 doses after the initial 2 injections.  He has persistence of scalp Psoriasis, with less skin involvement behind his years than on the prior visit.  He has no evidence of the nail pitting that he had in his right hand at the last office visit.  I suspect that he has not had a more significant response since he did not dose as prescribed.  I did ask him to dose every 2 weeks for total of 6 doses and then go back to every 4-week dosing thereafter.  He will call in January to report on his response.  Monitor disease activity and medication toxicity with lab work as ordered.  Follow-up 6 months or sooner if needed.  Follow-up with dermatologist yearly or sooner if needed.                Reviewed records, labs, and imaging with the patient in detail.  Counseled patient.  Discussion regarding my findings and recommendations.  Office visit with documentation 35 min.    Subjective:      Patient ID: Benoit Valencia is a 34 y.o. male.    Patient with psoriatic arthritis.  He was initially treated with Humira however he was switched to Tremfya in April 2023 in view of increased skin disease.  He has a history of dactylitis in his right great toe.  His symptoms have been stable however he continues to have persistent dry patches on flaking on his scalp.  He noted an increase in joint pain in the week or 2 prior to each Tremfya injection.  He also had an episode of discomfort in his right foot as well which resolved shortly after injecting.  He has had occasional episodes of lower back pain and discomfort, currently quiescent after being treated by the chiropractor.  Left hip arthralgias have resolved.  After the  last office visit, he was switched from Tremfya to Taltz.  Due to prior authorization and getting his prescription, we started it later when initially planned.  Unfortunately, he did not do the injection dosing as outlined on his prescription, and has been dosing every 4 weeks.  He believes he has had approximately 4 doses.  He has no current joint symptoms, but has persistence of scalp Psoriasis as well as skin involvement behind his ears.  He has continued an active exercise routine with running 2 to 4 miles several times weekly and lifting weights.  Morning stiffness is generally minutes duration.  He has no joint swelling and no signs of dactylitis or enthesitis at this time.     Lab work dated 10/3/2024 significant for CBC unremarkable.  CRP less than 3.0.  Sed rate 9.  Calcium 9.8.  Creatinine 0.86 with estimated .  Urinalysis benign.  QuantiFERON gold was negative dated 5/9/2024.        Allergies  No Known Allergies    Home Medications    Current Outpatient Medications:     ixekizumab (Taltz) 80 MG/ML subcutaneous injection, INJECT 80 MG (1 PEN) UNDER THE SKIN EVERY 2 WEEKS (WEEKS 4 THROUGH 12) THEN EVERY 4 WEEKS THEREAFTER, Disp: 2 mL, Rfl: 12    Multiple Vitamins-Minerals (MULTIVITAMIN ADULTS PO), Take by mouth, Disp: , Rfl:     ergocalciferol (VITAMIN D2) 50,000 units, Take 1 capsule (50,000 Units total) by mouth once a week for 12 doses, Disp: 12 capsule, Rfl: 0    neomycin-polymyxin-hydrocortisone (CORTISPORIN) 0.35%-10,000 units/mL-1% otic suspension, , Disp: , Rfl:     Past Medical History  Past Medical History:   Diagnosis Date    Psoriasis     Psoriatic arthritis (HCC)     Vitamin D insufficiency     Treated       Past Surgical History   Past Surgical History:   Procedure Laterality Date    WISDOM TOOTH EXTRACTION N/A        Family History   Family History   Problem Relation Age of Onset    Hypothyroidism Father     Arthritis Family         grandparents       The following portions of the  "patient's history were reviewed and updated as appropriate: allergies, current medications, past family history, past medical history, past social history, past surgical history, and problem list.    Review of Systems   Constitutional:  Negative for chills, fatigue and fever.   HENT:  Negative for ear pain, hearing loss, sore throat and tinnitus.    Eyes:  Negative for pain and visual disturbance.   Respiratory:  Negative for cough and shortness of breath.    Cardiovascular:  Negative for chest pain and palpitations.   Gastrointestinal:  Negative for abdominal pain, nausea and vomiting.   Genitourinary:  Negative for difficulty urinating, dysuria and hematuria.   Musculoskeletal:  Positive for back pain (Resolved with chiropractic treatment). Negative for gait problem, joint swelling, myalgias, neck pain and neck stiffness.   Skin:  Negative for color change and rash.        Psoriasis scalp and around hairline, as well as minimal skin involvement behind ears. No onycholysis or nail pitting.  No plaque psoriasis appreciated on extremities or trunk.   Neurological:  Negative for dizziness, seizures, syncope, weakness, numbness and headaches.   Psychiatric/Behavioral:  Negative for confusion, decreased concentration and sleep disturbance.    All other systems reviewed and are negative.        Objective:      /62   Pulse (!) 52   Temp (!) 96.7 °F (35.9 °C) (Temporal)   Ht 5' 10\" (1.778 m)   Wt 68.9 kg (152 lb)   SpO2 99%   BMI 21.81 kg/m²          Physical Exam  Vitals reviewed.   Constitutional:       Appearance: Normal appearance.   HENT:      Head: Normocephalic.      Nose:      Comments: Nose and throat unremarkable  Cardiovascular:      Rate and Rhythm: Normal rate and regular rhythm.   Pulmonary:      Breath sounds: Normal breath sounds.   Abdominal:      Palpations: Abdomen is soft.   Musculoskeletal:         General: Normal range of motion.      Cervical back: Neck supple.      Comments: Full range " of motion neck, bilateral shoulders, elbows, wrists.  No synovitis noted.  Schober's negative.  No SI joint tenderness appreciated.  Spasm noted on the prior visit has improved consider.  Schober's negative.  No SI joint tenderness noted.  Full range of motion bilateral hips, knees, ankles.  Patellofemoral crepitus noted bilateral knees.  No dactylitis noted.  Tailor's bunions bilateral feet.    Skin:     General: Skin is warm and dry.      Comments: Slight psoriasis scalp especially around hairline and frontal scalp.  Minimal changes behind ears.  The questionable minimal pits noted on a few finger nails on the right hand have resolved.  No onycholysis.   Neurological:      General: No focal deficit present.      Mental Status: He is alert.               This note was written in part using the assistance of the [employer] [id number] microphone system. Those portions using this system have been dictated and not read.

## 2024-10-12 NOTE — ASSESSMENT & PLAN NOTE
Psoriatic arthritis with Psoriasis with incomplete response with Humira and Tremfya.  He did start Taltz over the summer, however, he did not do the induction dosing every 2 weeks, and has only been injecting every 4 weeks with a total of approximately 4 doses thus far.  I suspect this is why he has not had significant benefit with his scalp Psoriasis.  The nail pitting seen on his right hand at the last office visit has resolved however and I do think that the Psoriasis behind his ears has lessened.  I suggested that he dose every 2 weeks for 6 doses to see if that will impact his response to therapy.  I asked him to call in January to report on his response.  Monitor disease activity and medication toxicity with lab work as ordered.  Follow-up 6 months or sooner if needed.  Follow-up with dermatologist once yearly or more often if needed.

## 2024-10-12 NOTE — ASSESSMENT & PLAN NOTE
No recurrence of dactylitis.  No sign of enthesopathy.  Recent switch to Taltz, however, he did not dose as ordered every 2 weeks for 6 doses after the initial 2 injections.  He has persistence of scalp Psoriasis, with less skin involvement behind his years than on the prior visit.  He has no evidence of the nail pitting that he had in his right hand at the last office visit.  I suspect that he has not had a more significant response since he did not dose as prescribed.  I did ask him to dose every 2 weeks for total of 6 doses and then go back to every 4-week dosing thereafter.  He will call in January to report on his response.  Monitor disease activity and medication toxicity with lab work as ordered.  Follow-up 6 months or sooner if needed.  Follow-up with dermatologist yearly or sooner if needed.

## 2024-10-12 NOTE — ASSESSMENT & PLAN NOTE
Psoriatic arthritis with history of incomplete response to Humira and Tremfya with no current joint pain.  Back pain noted on prior visit resolved with chiropractic treatment.  He does not have history of inflammatory back symptoms.  He is able to run several times weekly and do weight training.  He did start Taltz over the summer, however, he did not do the induction dosing every 2 weeks as ordered on the prescription, and has only had approximately 4 injections he believes thus far.  He does have some scalp psoriasis and minimal changes behind his ears.  The questionable early nail pitting noted in his right hand at the last visit has resolved however.  I suspect that he has had an incomplete response with Taltz since he did not dose it as ordered every 2 weeks for induction.  I have suggested that he dose every 2 weeks for 6 doses total and then go back to every 4 weeks thereafter to see if we can induce a better response with every 2-week dosing for approximately 3 months.  I asked him to call in January to report on his response to the change in dosing.  Monitor disease activity and medication toxicity with lab work as ordered.  Monitor QuantiFERON gold yearly.  He will be due in May.  New prescription for given for lab work.  Follow-up 6 months or sooner if needed.

## 2024-10-30 DIAGNOSIS — L40.59 POLYARTICULAR PSORIATIC ARTHRITIS (HCC): ICD-10-CM

## 2024-10-30 NOTE — TELEPHONE ENCOUNTER
Reason for call:   [x] Refill   [] Prior Auth  [x] Other: Patient states pharmacy does not have refills on file for the patient. Please resend script.    Office:   [] PCP/Provider -   [x] Specialty/Provider - Lindsay Urrutia MD     Medication: ixekizumab (Taltz) 80 MG/ML subcutaneous injection     Dose/Frequency: as directed     Quantity:  2 ml    Pharmacy: [address]     Does the patient have enough for 3 days?   [] Yes   [x] No - Send as HP to POD

## 2024-10-31 RX ORDER — IXEKIZUMAB 80 MG/ML
INJECTION, SOLUTION SUBCUTANEOUS
Qty: 2 ML | Refills: 12 | Status: SHIPPED | OUTPATIENT
Start: 2024-10-31

## 2024-11-26 ENCOUNTER — TELEPHONE (OUTPATIENT)
Age: 34
End: 2024-11-26

## 2024-11-26 NOTE — TELEPHONE ENCOUNTER
Patient called the RX Refill Line. Message is being forwarded to the office.     Patient is requesting a call back to explained why he needs is rx again for taltz.     Please contact patient at [phone]                                            Pharmacy:

## 2024-12-26 ENCOUNTER — TELEPHONE (OUTPATIENT)
Age: 34
End: 2024-12-26

## 2024-12-26 NOTE — TELEPHONE ENCOUNTER
Nayeli from [facility] is calling to speak to someone regarding the Taltz. She wants to clarify which exact dosage is needed for the patient. She can be reached at [phone] and provided reference number [id number].      Please advise.

## 2024-12-27 NOTE — TELEPHONE ENCOUNTER
Patient calling the Rxline about the Taltz prescription. Advised patient that the pharmacy already called about this. Patient is hoping this can be taken care of so he doesn't fall to far behind.

## 2024-12-27 NOTE — TELEPHONE ENCOUNTER
Spoke with patient - informed him that there are no providers in the office on Friday but Dr. Urrutia will be back on Mon and this will be addressed and the pharmacy will be notified.

## 2024-12-30 NOTE — TELEPHONE ENCOUNTER
Patient called to check the status of the medication. Patient made aware that Deisy from [facility] called earlier today to verify the script. Patient will contact [facility].

## 2024-12-30 NOTE — TELEPHONE ENCOUNTER
Deisy calls from [facility] and states that she would like to clerify pt had loading dose. Informed her of above note from provider and she verbalize understanding and gives thanks

## 2024-12-31 NOTE — TELEPHONE ENCOUNTER
"Patient called the rx line. He is requesting a call back in reference ixekizumab (Taltz) 80 MG/ML subcutaneous injection . He said the pharmacy [facility] need a \"Patient level authorization form\" sent and it need to be marked urgent. Please call the patient back about this matter.  "

## 2024-12-31 NOTE — TELEPHONE ENCOUNTER
PA for Taltz 80mg auto injector SUBMITTED to [employer]     via    [x]CMM-KEY: [id number] PA was dismissed due to having valid approval until 05/21/2025: see below:    []Surescripts-Case ID #   []Availity-Auth ID # NDC #   []Faxed to plan   []Other website   []Phone call Case ID #     [x]PA sent as URGENT    All office notes, labs and other pertaining documents and studies sent. Clinical questions answered. Awaiting determination from insurance company.     Turnaround time for your insurance to make a decision on your Prior Authorization can take 7-21 business days.

## 2025-01-02 DIAGNOSIS — L40.59 POLYARTICULAR PSORIATIC ARTHRITIS (HCC): ICD-10-CM

## 2025-01-02 RX ORDER — IXEKIZUMAB 80 MG/ML
INJECTION, SOLUTION SUBCUTANEOUS
Qty: 3 ML | Refills: 1 | Status: SHIPPED | OUTPATIENT
Start: 2025-01-02

## 2025-01-02 NOTE — TELEPHONE ENCOUNTER
Dr. Urrutia     [facility]    Please send separate prescription for Taltz maintenance dose ASAP.    Thank you

## 2025-01-02 NOTE — TELEPHONE ENCOUNTER
Spoke with [facility] - they are stating that the Rx is not being released b/c of an insurance restriction. Pharmacy and PA are only authorized for maintenance dosing. Since patient started maintenance dose and than Dr. Urrutia switch him back to a loading dose the last 12 weeks the auth is not covering this change in therapy. Provider would have to do an override with the pharmacy and have a new authorization to cover the remaining loading dose. Next shipment of Taltz will not be allowed until 1/15.     Spoke with patient - he has not completed the loading dose yet. His last dose was 12/10 and that was dose #5. Patient is unsure what to do at this pont b/c he is well over due for his next dose. Please advise.

## 2025-04-21 NOTE — TELEPHONE ENCOUNTER
Spoke with [employer] directly.   1) The appeal from 5/2 is not uploaded into their system yet so it is not even processed at this time.  2) Patient had several open PA's for [employer] in his account which was causing delays and confusion.    The rep I spoke to cancelled all cases but one that I will be resubmitting info to and hopefully have an approval.    Case # [id number]  Fax # [phone]    Spoke with patient and made him aware of the situation and let him know I will be resubmitting his information today. We will contact him with updates as we get them. Patient understood and appreciated the update.    Attending Attestation (For Attendings USE Only)...

## 2025-05-30 ENCOUNTER — TELEPHONE (OUTPATIENT)
Age: 35
End: 2025-05-30

## 2025-05-30 NOTE — TELEPHONE ENCOUNTER
Patient called the RX Refill Line. Message is being forwarded to the office.     Patient called in reference to   ixekizumab (Taltz) 80 MG/ML subcutaneous injection. Patient states he was just informed that [facility] needs additional information on the medication. Patient states he believes it has something to do with the the dose because the rep stated 40 mg/mL, but current script in his chart states 80 mg/mL. Patient would like [facility] contacted to update information. Patient requesting phone call once all information is corrected.     [facility]: [phone]

## 2025-06-03 NOTE — TELEPHONE ENCOUNTER
Patient called the RX Refill Line. Message is being forwarded to the office.     Patient is requesting a call back to follow up on this issue with xekizumab (Taltz) 80 MG/ML subcutaneous injection.     Please contact patient at [phone]

## 2025-06-05 NOTE — TELEPHONE ENCOUNTER
Called [facility] and spoke with Frank and he advised me that the estimated delivery date for the patients Taltz in June 11th.

## 2025-06-05 NOTE — TELEPHONE ENCOUNTER
PA for taltz 80mg  APPROVED     Date(s) approved 6/5/26    Case #    Patient advised by          []MyChart Message  [x]Phone call   []LMOM  []L/M to call office as no active Communication consent on file  []Unable to leave detailed message as VM not approved on Communication consent       Pharmacy advised by    [x]Fax  []Phone call  []Secure Chat    Specialty Pharmacy    [x][facility]      Approval letter scanned into [employer] Yes

## 2025-06-05 NOTE — TELEPHONE ENCOUNTER
PA for Taltz 80mg SUBMITTED to [employer]     via    []CMM-KEY:    [x]Surescripts-Case ID # [id number]   []Availity-Auth ID #  NDC #    []Faxed to plan   []Other website    []Phone call Case ID #      [x]PA sent as URGENT    All office notes, labs and other pertaining documents and studies sent. Clinical questions answered. Awaiting determination from insurance company.     Turnaround time for your insurance to make a decision on your Prior Authorization can take 7-21 business days.

## 2025-06-09 NOTE — TELEPHONE ENCOUNTER
Called and spoke with the patient. I let him know that his Taltz has an estimated delivery date of 6/11/2025, according to [facility]. Patient stated he understood.

## 2025-06-11 DIAGNOSIS — L40.59 POLYARTICULAR PSORIATIC ARTHRITIS (HCC): ICD-10-CM

## 2025-06-11 NOTE — TELEPHONE ENCOUNTER
Reason for call:   [x] Refill   [] Prior Auth  [] Other:     Office:   [] PCP/Provider -   [x] Specialty/Provider - RHEUM Lindsay Urrutia    Medication: Taltz injection    Dose/Frequency: 80 mg/ ml Q 4 weeks    Quantity: 3 ml     Pharmacy: [facility]   Does the patient have enough for 3 days?   [] Yes   [] No - Send as HP to POD    Mail Away Pharmacy   Does the patient have enough for 10 days?   [x] Yes   [] No - Send as HP to POD

## 2025-06-12 RX ORDER — IXEKIZUMAB 80 MG/ML
INJECTION, SOLUTION SUBCUTANEOUS
Qty: 3 ML | Refills: 1 | Status: SHIPPED | OUTPATIENT
Start: 2025-06-12

## 2025-06-19 DIAGNOSIS — L40.59 POLYARTICULAR PSORIATIC ARTHRITIS (HCC): ICD-10-CM

## 2025-06-19 RX ORDER — IXEKIZUMAB 80 MG/ML
INJECTION, SOLUTION SUBCUTANEOUS
Qty: 1 ML | Refills: 12 | OUTPATIENT
Start: 2025-06-19

## 2025-06-26 ENCOUNTER — TELEPHONE (OUTPATIENT)
Age: 35
End: 2025-06-26

## 2025-07-28 ENCOUNTER — TELEPHONE (OUTPATIENT)
Age: 35
End: 2025-07-28